# Patient Record
Sex: FEMALE | Race: WHITE
[De-identification: names, ages, dates, MRNs, and addresses within clinical notes are randomized per-mention and may not be internally consistent; named-entity substitution may affect disease eponyms.]

---

## 2020-08-21 ENCOUNTER — HOSPITAL ENCOUNTER (OUTPATIENT)
Dept: HOSPITAL 46 - DS | Age: 45
Discharge: HOME | End: 2020-08-21
Attending: SURGERY
Payer: COMMERCIAL

## 2020-08-21 VITALS — HEIGHT: 62 IN | BODY MASS INDEX: 27.05 KG/M2 | WEIGHT: 147 LBS

## 2020-08-21 DIAGNOSIS — F32.9: ICD-10-CM

## 2020-08-21 DIAGNOSIS — K80.10: Primary | ICD-10-CM

## 2020-08-21 DIAGNOSIS — Z79.899: ICD-10-CM

## 2020-08-21 PROCEDURE — BF13YZZ FLUOROSCOPY OF GALLBLADDER AND BILE DUCTS USING OTHER CONTRAST: ICD-10-PCS | Performed by: SURGERY

## 2020-08-21 PROCEDURE — 0FT44ZZ RESECTION OF GALLBLADDER, PERCUTANEOUS ENDOSCOPIC APPROACH: ICD-10-PCS | Performed by: SURGERY

## 2020-08-21 NOTE — NUR
08/21/20 1535 Sarika Richardson
1531 PATIENT ARRIVES TO PACU UNRESPONSIVE TO PAIN. ORAL AIRWAY IN
PLACE. RESP EVEN AND UNLABORED, MASK AT 10 TURNED DOWN TO 6 LITERS.

## 2020-08-21 NOTE — NUR
PT RESTING IN BED ON ENTRANCE TO , STATES PAIN IS MUCH BETTER AND RATES
3/10. PT DENIES ANY NAUSEA AND REQUESTS MORE CLARK CRACKERS. PT UP TO
BATHROOM WITH RN ASSIST, DENIES ANY DIZZINESS WITH POSITION CHANGE. STEADY
GAIT TO BATHROOM, ENC TO USE PULL CORD WITH ANY NEEDS. PT ABLE TO VOID 250 MLS
DARK URINE WITH NO PROBLEMS. PT BACK TO  5 TO GET DRESSED.

## 2020-08-21 NOTE — NUR
PT ARRIVES TO DS RM 5 FROM PACU AWAKE AND ALERT. PT DENIES ANY NAUSEA, HAS
ICED WATER IN HAND. PT STATES PAIN 5/10 IN ABD AND WOULD LIKE PAIN MEDICATION.
PT PROVIDED JELLO AND CRACKERS. PT HAS 20G IV IN RIGHT HAND, SALINE LOCKED. IV
FLUSHED WITH 20 MLS NS. PT PROVIDED GLASSES AND PERSONAL CELL PHONE. DC
CRITERIA EXPLAINED TO PT. CALL LIGHT WITHIN REACH.

## 2020-08-21 NOTE — NUR
DC INSTRUCTIONS PRESENTED TO PT, NO QUESTIONS ASKED. PAIN PRESCRIPTION AND
PICTURES PROVIDED IN DC PACKET. IV IN RIGHT HAND REMOVED, TIP IN TACT. SPOUSE
ORTIZ NOTIFIED FOR SAFE RIDE HOME. PT DC'S FROM DS RM 5 TO PERSONAL VEHICLE
HOME.

## 2020-08-25 NOTE — OR
[facility]
                                    [address], [place]  [address]
_________________________________________________________________________________________
                                                                 Signed   
 
 
DATE OF OPERATION:
08/21/2020
 
SURGEON:
Viktoriya Ward MD
 
PREOPERATIVE DIAGNOSIS:
Subacute calculous cholecystitis.
 
POSTOPERATIVE DIAGNOSIS:
Subacute calculous cholecystitis.
 
PROCEDURES PERFORMED:
1. Laparoscopic cholecystectomy with intraoperative cholangiogram.
2. Surgeon-directed fluoroscopy.
 
ANESTHESIA:
General endotracheal; Viktoriya Crow CRNA and local 10 mL of 0.25% Marcaine with
epinephrine. 
 
INDICATIONS:
This 44-year-old white woman is a patient of KATELYN Hall and is here for
symptomatic gallstones.  She was seen by me in the office 2 days ago with persistent and
accelerating right subcostal pain.  Examination showed mild tenderness in right
subcostal area.  A gallbladder ultrasound showed multiple stones.  She does have family
history of biliary disease in her grandmother and her mother.  She is admitted at this
time to undergo cholecystectomy preferred by laparoscopic approach.  The risks of
bleeding, infection, bile duct injury, need for open procedure and other unforeseen
complications was all reviewed with her.  She understands and wished to proceed. 
 
FINDINGS:
The liver was normal.  There were pronounced fetal lobulations of the left hepatic lobe
including the medial segment of the left lobe.  The gallbladder itself was chronically
inflamed and there were multiple yellow and green multifaceted stones.  Complete
cholangiogram showed no sign of abnormality.  The liver was normal.  There were no other
findings of concern. 
 
DESCRIPTION OF PROCEDURE:
The patient was brought to the operating room, given a general endotracheal anesthetic.
Preoperative antibiotic Ancef was given.  Sequential compression device stockings used
and heparin subcutaneously administered.  The abdomen was prepared with a chlorhexidine
solution and draped sterilely.  An infraumbilical incision was made and using an open
 
    Electronically Signed By: VIKTORIYA WARD MD  08/25/20 1828
_________________________________________________________________________________________
PATIENT NAME:     DONNY TORIBIO                   
MEDICAL RECORD #: [id number]            OPERATIVE REPORT              
          ACCT #: [id number]  
DATE OF BIRTH:   [birth year]            REPORT #: [id number]      
PHYSICIAN:        VIKTORIYA WARD MD                 
PCP:              HEIDI MARTINEZ            
REPORT IS CONFIDENTIAL AND NOT TO BE RELEASED WITHOUT AUTHORIZATION
 
 
                                  [facility]
                                    [address], [place]  [address]
_________________________________________________________________________________________
                                                                 Signed   
 
 
Ojsh cannula technique, pneumoperitoneum was achieved to a level of 14 mmHg of carbon
dioxide gas.  Intraabdominal inspection showed no sign of ascites or carcinomatosis.
The gallbladder was obscured from view initially.  Three additional trocars were placed
in usual configuration in the subxiphoid, right midclavicular, and right anterior
axillary line.  The gallbladder was elevated cephalad and found to be quite floppy, but
chronically inflamed with an enlarged pericholecystic lymph node.  The medial segment of
left lobe of the liver was somewhat redundant and floppy, but with various
manipulations, it could be worked around without traction of that lobe.  Using blunt and
electrocautery dissection, the triangle of Calot was dissected free, maintaining the
enlarged pericholecystic lymph node with the infundibulum of the gallbladder.  A clip
was applied across gallbladder cystic duct junction and a transverse choledochotomy made
in the cystic duct.  Egress of clear bile was noted.  Using an Thompson type
cholangiocatheter, intraoperative cholangiography was undertaken showing free flow of
contrast in biliary tree with prompt emptying into the duodenum.  There was no sign of
filling defect, no biliary anomaly or other problem.  The catheter was removed.  The
cystic duct was triply clipped and divided.  The gallbladder was then dissected free in
a retrograde fashion using electrocautery.  Clips were applied to the cystic arterial
branches as necessary.  The gallbladder was removed fully and extracted through the
infraumbilical port site without problem.  The gallbladder was opened on the back table
and found to have multiple variably sized yellow multifaceted gallstones.  There was no
sign of neoplasm of mucosa.  Subacute inflammation of mucosa was noted. 
 
Irrigation was undertaken in subhepatic space.  There was no sign of bile leak,
bleeding, or other problems.  The trocars were removed under direct visualization
showing no sign of bleeding.  The infraumbilical fascial incision was reapproximated
with interrupted 0 Vicryl suture and a running 0 PDS suture.  A 10 mL of 0.25% Marcaine
with epinephrine was injected locally.  The skin was then closed with interrupted 3-0
Vicryl.  Steri-Strips were applied. 
 
The patient was ultimately extubated and transferred to the recovery room in good
condition having suffered no complications.  Sponge, needle, and counts were as correct
x3. 
 
 
 
            ________________________________________
            MD HAYDEN Casiano/MODL
Job #:  [id number]
DD:  08/21/2020 15:42:18
 
    Electronically Signed By: VIKTORIYA WARD MD  08/25/20 1828
_________________________________________________________________________________________
PATIENT NAME:     DONNY TORIBIO                   
MEDICAL RECORD #: [id number]            OPERATIVE REPORT              
          ACCT #: [id number]  
DATE OF BIRTH:   [birth year]            REPORT #: [id number]      
PHYSICIAN:        VITKORIYA WARD MD                 
PCP:              HEIDI MARTINEZ            
REPORT IS CONFIDENTIAL AND NOT TO BE RELEASED WITHOUT AUTHORIZATION
 
 
                                  [address]  [address]
_________________________________________________________________________________________
                                                                 Signed   
 
 
DT:  08/21/2020 22:01:19
 
cc:            KATELYN Hall
 
 
Copies:  HEIDI MARTINEZ
~
 
 
 
 
 
 
 
 
 
 
 
 
 
 
 
 
 
 
 
 
 
 
 
 
 
 
 
 
 
 
 
 
 
 
 
 
    Electronically Signed By: VIKTORIYA WARD MD  08/25/20 1828
_________________________________________________________________________________________
PATIENT NAME:     MALUDONNY                   
MEDICAL RECORD #: [id number]            OPERATIVE REPORT              
          ACCT #: [id number]  
DATE OF BIRTH:   [birth year]            REPORT #: [id number]      
PHYSICIAN:        VIKTORIYA WARD MD                 
PCP:              HEIDI MARTINEZ            
REPORT IS CONFIDENTIAL AND NOT TO BE RELEASED WITHOUT AUTHORIZATION

## 2020-08-27 NOTE — PATH
[facility]
                                    [address], [place]  [address]
_________________________________________________________________________________________
                                                                 Signed   
 
 
 
SPECIMEN(S): A GALLBLADDER AND STONES
 
SPECIMEN SOURCE:
A. GALLBLADDER AND STONES
 
CLINICAL HISTORY:
Chronic cholecystitis.
 
FINAL PATHOLOGIC DIAGNOSIS:
Gallbladder, cholecystectomy:
-  Chronic cholecystitis.
-  Cholelithiasis.
-  One lymph node with no evidence of malignancy.
NAL:cml:C2NR
 
MICROSCOPIC EXAMINATION:
Histologic sections of all submitted blocks are examined by light microscopy.  
These findings, together with the gross examination, support the pathologic 
diagnosis. 
 
GROSS DESCRIPTION:
The specimen, labeled "Jodie Toribio," and designated on the requisition 
"gallbladder and stones," is received in formalin and consists of 
Specimen: Previously opened gallbladder.
Dimensions: 10.3 x 3.5 x 1.2 cm.
Serosa: Violaceous and smooth.
Cystic Duct: Unobstructed and inked.
Calculi: Multiple yellow-green, smooth, angular stones, aggregate measurement 
6.5 x 5.0 x 2.7 cm. 
Mucosa: Green and velvety.
Wall thickness: 0.4 cm.
Lymph node: One pink possible pericystic lymph node present measuring 0.8 cm in 
greatest dimension. 
Additional: None.
Representative sections are submitted in cassette (A1).
FB (under the direct supervision of a pathologist)
Additional sections of gallbladder are submitted in cassette A2 per Dr. Suazo's 
request. 
The Gross Description was prepared using a voice recognition system.  The 
report was reviewed for accuracy; however, sound-alike word errors, addition 
and/or deletions may occur.  If there is any 
 
                                                                                    
_________________________________________________________________________________________
PATIENT NAME:     JODIE TORIBIO                   
MEDICAL RECORD #: [id number]            PATHOLOGY                     
          ACCT #: [id number]       ACCESSION #: [id number]     
DATE OF BIRTH:   [birth year]            REPORT #: [id number]       
PHYSICIAN:        SHELLY RONDON              
PCP:              HEIDI MARTINEZ            
REPORT IS CONFIDENTIAL AND NOT TO BE RELEASED WITHOUT AUTHORIZATION
 
 
                                  [facility]
                                    [address], [place]  [address]
_________________________________________________________________________________________
                                                                 Signed   
 
 
 
question about this report, please contact Client Services.
 
PERFORMING LABORATORY:
The technical component was performed [place] [address] (Medical Director: Stephanie Simpson MD; CLIA# [id number]). 
Professional interpretation was performed by 
[facility], [address] [address] (CLIA# [id number]). 
 
Diagnostician:  Rufina Suazo MD
Pathologist
Electronically Signed 08/27/2020
 
 
Copies:                                
~
 
 
 
 
 
 
 
 
 
 
 
 
 
 
 
 
 
 
 
 
 
 
 
 
 
 
                                                                                    
_________________________________________________________________________________________
PATIENT NAME:     JODIE TORIBIO                   
MEDICAL RECORD #: [id number]            PATHOLOGY                     
          ACCT #: [id number]       ACCESSION #: [id number]     
DATE OF BIRTH:   [birth year]            REPORT #: [id number]       
PHYSICIAN:        SHELLY RONDON              
PCP:              HEIDI MARTINEZ            
REPORT IS CONFIDENTIAL AND NOT TO BE RELEASED WITHOUT AUTHORIZATION